# Patient Record
Sex: FEMALE | Race: WHITE | NOT HISPANIC OR LATINO | Employment: OTHER | URBAN - METROPOLITAN AREA
[De-identification: names, ages, dates, MRNs, and addresses within clinical notes are randomized per-mention and may not be internally consistent; named-entity substitution may affect disease eponyms.]

---

## 2017-04-26 ENCOUNTER — TRANSCRIBE ORDERS (OUTPATIENT)
Dept: ADMINISTRATIVE | Facility: HOSPITAL | Age: 72
End: 2017-04-26

## 2017-04-26 DIAGNOSIS — Z12.31 VISIT FOR SCREENING MAMMOGRAM: Primary | ICD-10-CM

## 2021-03-17 ENCOUNTER — HOSPITAL ENCOUNTER (EMERGENCY)
Facility: HOSPITAL | Age: 76
Discharge: HOME | End: 2021-03-17
Attending: EMERGENCY MEDICINE | Admitting: EMERGENCY MEDICINE

## 2021-03-17 ENCOUNTER — APPOINTMENT (OUTPATIENT)
Dept: GENERAL RADIOLOGY | Facility: HOSPITAL | Age: 76
End: 2021-03-17

## 2021-03-17 VITALS
RESPIRATION RATE: 16 BRPM | HEART RATE: 88 BPM | DIASTOLIC BLOOD PRESSURE: 88 MMHG | SYSTOLIC BLOOD PRESSURE: 154 MMHG | TEMPERATURE: 98.5 F | OXYGEN SATURATION: 98 %

## 2021-03-17 DIAGNOSIS — S52.531A CLOSED COLLES' FRACTURE OF RIGHT RADIUS, INITIAL ENCOUNTER: Primary | ICD-10-CM

## 2021-03-17 PROCEDURE — 73070 X-RAY EXAM OF ELBOW: CPT

## 2021-03-17 PROCEDURE — 25010000003 LIDOCAINE 1 % SOLUTION: Performed by: EMERGENCY MEDICINE

## 2021-03-17 PROCEDURE — 99283 EMERGENCY DEPT VISIT LOW MDM: CPT

## 2021-03-17 PROCEDURE — 73110 X-RAY EXAM OF WRIST: CPT

## 2021-03-17 RX ORDER — LIDOCAINE HYDROCHLORIDE 10 MG/ML
10 INJECTION, SOLUTION INFILTRATION; PERINEURAL ONCE
Status: COMPLETED | OUTPATIENT
Start: 2021-03-17 | End: 2021-03-17

## 2021-03-17 RX ORDER — HYDROCODONE BITARTRATE AND ACETAMINOPHEN 7.5; 325 MG/1; MG/1
1 TABLET ORAL ONCE
Status: COMPLETED | OUTPATIENT
Start: 2021-03-17 | End: 2021-03-17

## 2021-03-17 RX ORDER — BUPIVACAINE HYDROCHLORIDE 5 MG/ML
10 INJECTION, SOLUTION EPIDURAL; INTRACAUDAL ONCE
Status: COMPLETED | OUTPATIENT
Start: 2021-03-17 | End: 2021-03-17

## 2021-03-17 RX ORDER — HYDROCODONE BITARTRATE AND ACETAMINOPHEN 7.5; 325 MG/1; MG/1
1 TABLET ORAL EVERY 6 HOURS PRN
Qty: 15 TABLET | Refills: 0 | Status: SHIPPED | OUTPATIENT
Start: 2021-03-17

## 2021-03-17 RX ADMIN — HYDROCODONE BITARTRATE AND ACETAMINOPHEN 1 TABLET: 7.5; 325 TABLET ORAL at 19:22

## 2021-03-17 RX ADMIN — BUPIVACAINE HYDROCHLORIDE 10 ML: 5 INJECTION, SOLUTION EPIDURAL; INTRACAUDAL at 19:23

## 2021-03-17 RX ADMIN — LIDOCAINE HYDROCHLORIDE 10 ML: 10 INJECTION, SOLUTION INFILTRATION; PERINEURAL at 19:23

## 2021-03-17 NOTE — ED NOTES
Report received from KADI Bynum. Pt and FM updated on status. Pt in mask, nurse in PPE.       Jazlyn Bain RN  03/17/21 1956

## 2021-03-17 NOTE — ED TRIAGE NOTES
Pt to the ED from home after a mechanical fall at home with wrist pain. Pt states she was trying to keep her cat from getting out the door when she tripped over her own foot. Pt placed in mask during triage. This RN wearing proper PPE, mask and glasses, during pt encounter. Hand hygiene performed before and after pt encounter.

## 2021-03-17 NOTE — ED PROVIDER NOTES
" EMERGENCY DEPARTMENT ENCOUNTER    Room Number:  38/38  Date of encounter:  3/18/2021  PCP: Provider, No Known  Historian: Patient      HPI:  Chief Complaint: Fall  A complete HPI/ROS/PMH/PSH/SH/FH are unobtainable due to: None    Context: Prudence Leavitt is a 75 y.o. female who presents to the ED c/o tripping and falling over her cat last night at home.  Patient said she fell onto an outstretched right arm injuring both her elbow and wrist.  She complains of severe pain which is mostly located in the right wrist, but there is some radiation and tenderness of the right elbow.  The pain is severe with movement, and she states that she feels like \"it looks funny\".  There is no numbness or tingling in the hand, and she claims no other injury from the fall.      PAST MEDICAL HISTORY  Active Ambulatory Problems     Diagnosis Date Noted   • No Active Ambulatory Problems     Resolved Ambulatory Problems     Diagnosis Date Noted   • No Resolved Ambulatory Problems     No Additional Past Medical History         PAST SURGICAL HISTORY  No past surgical history on file.      FAMILY HISTORY  No family history on file.      SOCIAL HISTORY  Social History     Socioeconomic History   • Marital status:      Spouse name: Not on file   • Number of children: Not on file   • Years of education: Not on file   • Highest education level: Not on file         ALLERGIES  Patient has no known allergies.        REVIEW OF SYSTEMS  Review of Systems     All systems reviewed and negative except for those discussed in HPI.       PHYSICAL EXAM    I have reviewed the triage vital signs and nursing notes.    ED Triage Vitals [03/17/21 1853]   Temp Heart Rate Resp BP SpO2   98.5 °F (36.9 °C) (!) 122 16 -- 96 %      Temp src Heart Rate Source Patient Position BP Location FiO2 (%)   Tympanic -- -- -- --       Physical Exam  GENERAL: Awake and alert, appears uncomfortable, arrives in a sling  HENT: nares patent  EYES: no scleral icterus  CV: regular " rhythm, regular rate  RESPIRATORY: normal effort  ABDOMEN: soft  MUSCULOSKELETAL: There is swelling and suggestion of deformity at the right wrist which would be consistent with a Colles' fracture.  The hand is neurovascular intact, but there is some moderate swelling.  There is also some soft tissue tenderness at the elbow, but there is no deformity, swelling or edema to suggest fracture in that area.  NEURO: alert, moves all extremities, follows commands  SKIN: warm, dry        LAB RESULTS  No results found for this or any previous visit (from the past 24 hour(s)).    Ordered the above labs and independently reviewed the results.        RADIOLOGY  XR Elbow 2 View Right    Result Date: 3/17/2021  RIGHT ELBOW, 2 VIEWS  HISTORY: Fall, right elbow pain  COMPARISON: None  FINDINGS: No evidence of fracture or dislocation. No evidence for joint effusion. Soft tissue structures are unremarkable.      No acute findings  This report was finalized on 3/17/2021 8:15 PM by Dr. Mick Romero M.D.      XR Wrist 3+ View Right    Result Date: 3/17/2021  RIGHT WRIST, 3 VIEWS  HISTORY: Fall, right wrist pain  COMPARISON: None  FINDINGS: There is an impacted fracture of the distal radius with dorsal angulation of the distal fracture fragment. Carpal alignment appears maintained. Soft tissue swelling about the wrist.      Impacted fracture of the distal radius  This report was finalized on 3/17/2021 8:14 PM by Dr. Mick Romero M.D.        I ordered the above noted radiological studies. Reviewed by me and discussed with radiologist.  See dictation for official radiology interpretation.      PROCEDURES    Procedures      MEDICATIONS GIVEN IN ER    Medications   HYDROcodone-acetaminophen (NORCO) 7.5-325 MG per tablet 1 tablet (1 tablet Oral Given 3/17/21 1922)   lidocaine (XYLOCAINE) 1 % injection 10 mL (10 mL Injection Given by Other 3/17/21 1923)   bupivacaine (PF) (MARCAINE) 0.5 % injection 10 mL (10 mL Injection Given 3/17/21  1923)         PROGRESS, DATA ANALYSIS, CONSULTS, AND MEDICAL DECISION MAKING    All labs have been independently reviewed by me.  All radiology studies have been reviewed by me and discussed with radiologist dictating the report.   EKG's independently viewed and interpreted by me.  Discussion below represents my analysis of pertinent findings related to patient's condition, differential diagnosis, treatment plan and final disposition.        ED Course as of Mar 18 0747   u Mar 18, 2021   0745 Plain film of the right elbow is negative.  Plain film of the right wrist shows compacted and angulated Colles' fracture.    [DP]   0746 Please see the PA note for reduction of the fracture and splinting.    [DP]   0746 Patient tolerated the procedure well under hematoma block.    [DP]   0746 She likely will need surgery for repair, and I referred her to Dr. Florian in the outpatient setting.    [DP]   0746 She tolerated p.o. pain medications well, and I prescribed her a few days of those.    [DP]      ED Course User Index  [DP] Charles Sloan MD           PPE: Both the patient and I wore a surgical mask throughout the entire patient encounter. I wore protective goggles.    AS OF 07:47 EDT VITALS:    BP - 154/88  HR - 88  TEMP - 98.5 °F (36.9 °C) (Tympanic)  O2 SATS - 98%        DIAGNOSIS  Final diagnoses:   Closed Colles' fracture of right radius, initial encounter         DISPOSITION  Discharge           Charles Sloan MD  03/18/21 0747

## 2021-03-18 NOTE — ED PROVIDER NOTES
Splint - Cast - Strapping    Date/Time: 3/17/2021 9:00 PM  Performed by: Brian Ingram PA  Authorized by: Charles Sloan MD     Consent:     Consent obtained:  Verbal    Consent given by:  Patient  Pre-procedure details:     Sensation:  Normal  Procedure details:     Laterality:  Right    Location:  Wrist    Splint type:  Sugar tong    Supplies:  Elastic bandage, Ortho-Glass and cotton padding  Post-procedure details:     Pain:  Improved    Sensation:  Normal    Patient tolerance of procedure:  Tolerated well, no immediate complications    Nerve Block    Date/Time: 3/18/2021 12:58 AM  Performed by: Brian Ingram PA  Authorized by: Charles Sloan MD     Consent:     Consent obtained:  Verbal    Consent given by:  Patient  Indications:     Indications:  Pain relief  Location:     Body area:  Upper extremity    Laterality:  Right  Pre-procedure details:     Skin preparation:  Alcohol  Skin anesthesia (see MAR for exact dosages):     Skin anesthesia method:  Local infiltration    Local anesthetic:  Lidocaine 1% w/o epi and bupivacaine 0.5% w/o epi  Procedure details (see MAR for exact dosages):     Block needle gauge:  27 G    Injection procedure:  Anatomic landmarks identified  Post-procedure details:     Outcome:  Pain improved    Patient tolerance of procedure:  Tolerated well, no immediate complications           Brian Ingram PA  03/18/21 0059